# Patient Record
Sex: FEMALE | Race: WHITE | NOT HISPANIC OR LATINO | URBAN - METROPOLITAN AREA
[De-identification: names, ages, dates, MRNs, and addresses within clinical notes are randomized per-mention and may not be internally consistent; named-entity substitution may affect disease eponyms.]

---

## 2017-07-03 ENCOUNTER — OFFICE (OUTPATIENT)
Dept: URBAN - METROPOLITAN AREA CLINIC 32 | Facility: CLINIC | Age: 47
End: 2017-07-03
Payer: COMMERCIAL

## 2017-07-03 ENCOUNTER — INDEPENDENT LABORATORY (OUTPATIENT)
Dept: URBAN - METROPOLITAN AREA PATHOLOGY 17 | Facility: PATHOLOGY | Age: 47
End: 2017-07-03
Payer: COMMERCIAL

## 2017-07-03 VITALS
DIASTOLIC BLOOD PRESSURE: 79 MMHG | DIASTOLIC BLOOD PRESSURE: 72 MMHG | RESPIRATION RATE: 16 BRPM | HEART RATE: 70 BPM | HEART RATE: 75 BPM | SYSTOLIC BLOOD PRESSURE: 118 MMHG | SYSTOLIC BLOOD PRESSURE: 103 MMHG | OXYGEN SATURATION: 99 % | TEMPERATURE: 97.7 F | HEIGHT: 68 IN | WEIGHT: 205 LBS | OXYGEN SATURATION: 97 % | TEMPERATURE: 97.8 F

## 2017-07-03 DIAGNOSIS — Z53.8 PROCEDURE AND TREATMENT NOT CARRIED OUT FOR OTHER REASONS: ICD-10-CM

## 2017-07-03 DIAGNOSIS — Z86.010 PERSONAL HISTORY OF COLONIC POLYPS: ICD-10-CM

## 2017-07-03 DIAGNOSIS — K62.1 RECTAL POLYP: ICD-10-CM

## 2017-07-03 PROBLEM — K64.8 OTHER HEMORRHOIDS: Status: ACTIVE | Noted: 2017-07-03

## 2017-07-03 PROBLEM — D12.0 BENIGN NEOPLASM OF CECUM: Status: ACTIVE | Noted: 2017-07-03

## 2017-07-03 PROBLEM — K64.4 RESIDUAL HEMORRHOIDAL SKIN TAGS: Status: ACTIVE | Noted: 2017-07-03

## 2017-07-03 PROCEDURE — 88305 TISSUE EXAM BY PATHOLOGIST: CPT

## 2017-07-03 PROCEDURE — 00810: CPT | Mod: QS,AA

## 2017-07-03 PROCEDURE — 00810: CPT | Mod: AA,QS

## 2017-07-26 PROBLEM — Z80.0 FAMILY HISTORY OF COLON CANCER: Status: ACTIVE | Noted: 2017-07-03

## 2021-02-03 ENCOUNTER — TELEHEALTH PROVIDED OTHER THAN IN PATIENT'S HOME (OUTPATIENT)
Dept: URBAN - METROPOLITAN AREA TELEHEALTH 7 | Facility: TELEHEALTH | Age: 51
End: 2021-02-03

## 2021-02-03 VITALS — WEIGHT: 205 LBS | HEIGHT: 68 IN

## 2021-02-03 DIAGNOSIS — Z86.010 PERSONAL HISTORY OF COLONIC POLYPS: ICD-10-CM

## 2021-02-03 DIAGNOSIS — K59.09 OTHER CONSTIPATION: ICD-10-CM

## 2021-02-03 PROCEDURE — 99203 OFFICE O/P NEW LOW 30 MIN: CPT | Mod: 95 | Performed by: PHYSICIAN ASSISTANT

## 2021-02-03 NOTE — SERVICEHPINOTES
PATIENT VERIFIED BY DATE OF BIRTH AND NAME. Patient has been consented for this telecommunication visit.   Ms. Shonebarger is here to discuss constipation. Constipation has been present for as long as she can remember. In 2017 a colonoscopy was obtained which showed a hyperplastic polyp and a tortuous colon right colon wasn't visualized d/t poor prep and tortuous colon. BE was normal given a 5 yr recall for the colonoscopy.  She always has an infrequent BM. Sometimes stools are hard and sometimes they are soft. Has tried Miralax and didn't find this to be effective. Symptoms have gotten worse since she is perimenopausal. Wants to try a prescription medication    ROS as per HPI and otherwise is unremarkable

## 2021-03-03 ENCOUNTER — OFFICE (OUTPATIENT)
Dept: URBAN - METROPOLITAN AREA TELEHEALTH 7 | Facility: TELEHEALTH | Age: 51
End: 2021-03-03

## 2021-03-03 VITALS — WEIGHT: 205 LBS | HEIGHT: 68 IN

## 2021-03-03 DIAGNOSIS — K59.09 OTHER CONSTIPATION: ICD-10-CM

## 2021-03-03 PROCEDURE — 99213 OFFICE O/P EST LOW 20 MIN: CPT | Performed by: PHYSICIAN ASSISTANT

## 2021-03-03 NOTE — SERVICEHPINOTES
PATIENT VERIFIED BY DATE OF BIRTH AND NAME. Patient has been consented for this telecommunication visit.   Ms. Shonebarger is here for a f/u regarding chronic constipation. Constipation    has been present for as long as she can remember. In 2017 a colonoscopy was obtained which showed a hyperplastic polyp and a tortuous colon right colon wasn't visualized d/t poor prep and tortuous colon. BE was normal given a 5 yr recall for the colonoscopy. We saw her in February 2021. She had discussed that constipation was a lifelong issue for her, usually has infrequent BMs. We tried Linzess 145 mcg and then upped the dose to 290 mcg. She had a BM immediately after but incomplete evacuation. She is still going several days without a BM. She is drinking a lot of water with it.  BRROS as per HPI and otherwise is unremarkable.

## 2022-02-16 ENCOUNTER — TELEHEALTH PROVIDED OTHER THAN IN PATIENT'S HOME (OUTPATIENT)
Dept: URBAN - METROPOLITAN AREA TELEHEALTH 3 | Facility: TELEHEALTH | Age: 52
End: 2022-02-16

## 2022-02-16 VITALS — HEIGHT: 68 IN | WEIGHT: 200 LBS

## 2022-02-16 DIAGNOSIS — K59.09 OTHER CONSTIPATION: ICD-10-CM

## 2022-02-16 PROCEDURE — 99213 OFFICE O/P EST LOW 20 MIN: CPT | Mod: 95 | Performed by: PHYSICIAN ASSISTANT

## 2022-02-16 NOTE — SERVICEHPINOTES
PATIENT VERIFIED BY DATE OF BIRTH AND NAME. Patient has been consented for this telecommunication visit.   Ms. Shonebarger is here for f/u regarding constipation.   Constipation has been present for as long as she can remember. In 2017 a colonoscopy was obtained which showed a hyperplastic polyp and a tortuous colon right colon wasn't visualized d/t poor prep and tortuous colon. She has tried both Linzess and Trulance and likes Linzess better. Overall, has at least 3 full BMs per week. She tries to drink a lot of water and eat high fiber. No abdominal pain or bloating issues. BE was normal given a 5 yr recall for the colonoscopy (due 07/2022). ROS as per HPI and otherwise unremarkable.

## 2022-09-19 PROBLEM — Z86.010 PERSONAL HISTORY OF COLON POLYPS: Status: ACTIVE | Noted: 2022-09-19

## 2022-11-07 ENCOUNTER — OFFICE (OUTPATIENT)
Dept: URBAN - METROPOLITAN AREA CLINIC 98 | Facility: CLINIC | Age: 52
End: 2022-11-07
Payer: COMMERCIAL

## 2022-11-07 VITALS
DIASTOLIC BLOOD PRESSURE: 56 MMHG | DIASTOLIC BLOOD PRESSURE: 72 MMHG | TEMPERATURE: 98.3 F | SYSTOLIC BLOOD PRESSURE: 133 MMHG | DIASTOLIC BLOOD PRESSURE: 54 MMHG | RESPIRATION RATE: 16 BRPM | RESPIRATION RATE: 7 BRPM | DIASTOLIC BLOOD PRESSURE: 55 MMHG | DIASTOLIC BLOOD PRESSURE: 58 MMHG | DIASTOLIC BLOOD PRESSURE: 68 MMHG | DIASTOLIC BLOOD PRESSURE: 77 MMHG | WEIGHT: 185 LBS | HEART RATE: 76 BPM | SYSTOLIC BLOOD PRESSURE: 96 MMHG | SYSTOLIC BLOOD PRESSURE: 105 MMHG | HEART RATE: 58 BPM | HEART RATE: 59 BPM | SYSTOLIC BLOOD PRESSURE: 97 MMHG | DIASTOLIC BLOOD PRESSURE: 63 MMHG | RESPIRATION RATE: 15 BRPM | DIASTOLIC BLOOD PRESSURE: 52 MMHG | HEART RATE: 64 BPM | HEART RATE: 66 BPM | SYSTOLIC BLOOD PRESSURE: 91 MMHG | RESPIRATION RATE: 10 BRPM | OXYGEN SATURATION: 99 % | HEART RATE: 56 BPM | RESPIRATION RATE: 14 BRPM | SYSTOLIC BLOOD PRESSURE: 88 MMHG | TEMPERATURE: 97.2 F | HEART RATE: 60 BPM | SYSTOLIC BLOOD PRESSURE: 106 MMHG | DIASTOLIC BLOOD PRESSURE: 86 MMHG | HEIGHT: 68 IN | RESPIRATION RATE: 13 BRPM | OXYGEN SATURATION: 100 % | RESPIRATION RATE: 12 BRPM | SYSTOLIC BLOOD PRESSURE: 138 MMHG | SYSTOLIC BLOOD PRESSURE: 103 MMHG

## 2022-11-07 DIAGNOSIS — Z86.010 PERSONAL HISTORY OF COLONIC POLYPS: ICD-10-CM

## 2022-11-07 DIAGNOSIS — K56.2 VOLVULUS: ICD-10-CM

## 2022-11-07 DIAGNOSIS — D12.5 BENIGN NEOPLASM OF SIGMOID COLON: ICD-10-CM

## 2022-11-07 DIAGNOSIS — K57.30 DIVERTICULOSIS OF LARGE INTESTINE WITHOUT PERFORATION OR ABS: ICD-10-CM

## 2022-11-07 DIAGNOSIS — Z80.0 FAMILY HISTORY OF MALIGNANT NEOPLASM OF DIGESTIVE ORGANS: ICD-10-CM

## 2022-11-07 DIAGNOSIS — D12.2 BENIGN NEOPLASM OF ASCENDING COLON: ICD-10-CM

## 2022-11-07 DIAGNOSIS — D12.0 BENIGN NEOPLASM OF CECUM: ICD-10-CM

## 2022-11-07 DIAGNOSIS — Z83.71 FAMILY HISTORY OF COLONIC POLYPS: ICD-10-CM

## 2022-11-07 PROBLEM — K63.5 POLYP OF COLON: Status: ACTIVE | Noted: 2022-11-07
